# Patient Record
Sex: MALE | Race: WHITE | NOT HISPANIC OR LATINO | Employment: OTHER | URBAN - NONMETROPOLITAN AREA
[De-identification: names, ages, dates, MRNs, and addresses within clinical notes are randomized per-mention and may not be internally consistent; named-entity substitution may affect disease eponyms.]

---

## 2018-02-26 ENCOUNTER — OFFICE VISIT (OUTPATIENT)
Dept: FAMILY MEDICINE CLINIC | Facility: CLINIC | Age: 78
End: 2018-02-26

## 2018-02-26 VITALS
WEIGHT: 149.2 LBS | HEART RATE: 62 BPM | DIASTOLIC BLOOD PRESSURE: 40 MMHG | SYSTOLIC BLOOD PRESSURE: 91 MMHG | HEIGHT: 69 IN | OXYGEN SATURATION: 97 % | BODY MASS INDEX: 22.1 KG/M2

## 2018-02-26 DIAGNOSIS — R29.6 MULTIPLE FALLS: ICD-10-CM

## 2018-02-26 DIAGNOSIS — R53.1 WEAKNESS: Primary | ICD-10-CM

## 2018-02-26 DIAGNOSIS — N19 RENAL FAILURE, UNSPECIFIED CHRONICITY: ICD-10-CM

## 2018-02-26 DIAGNOSIS — R53.1 GENERALIZED WEAKNESS: ICD-10-CM

## 2018-02-26 DIAGNOSIS — R41.0 INTERMITTENT CONFUSION: ICD-10-CM

## 2018-02-26 LAB
ALBUMIN SERPL-MCNC: 4 G/DL (ref 3.4–4.8)
ALBUMIN/GLOB SERPL: 1.4 G/DL (ref 1.5–2.5)
ALP SERPL-CCNC: 91 U/L (ref 40–129)
ALT SERPL W P-5'-P-CCNC: 7 U/L (ref 10–44)
ANION GAP SERPL CALCULATED.3IONS-SCNC: 8.9 MMOL/L (ref 3.6–11.2)
AST SERPL-CCNC: 16 U/L (ref 10–34)
BASOPHILS # BLD AUTO: 0.05 10*3/MM3 (ref 0–0.3)
BASOPHILS NFR BLD AUTO: 0.5 % (ref 0–2)
BILIRUB SERPL-MCNC: 0.4 MG/DL (ref 0.2–1.8)
BUN BLD-MCNC: 37 MG/DL (ref 7–21)
BUN/CREAT SERPL: 17.6 (ref 7–25)
CALCIUM SPEC-SCNC: 8.9 MG/DL (ref 7.7–10)
CHLORIDE SERPL-SCNC: 106 MMOL/L (ref 99–112)
CO2 SERPL-SCNC: 24.1 MMOL/L (ref 24.3–31.9)
CREAT BLD-MCNC: 2.1 MG/DL (ref 0.43–1.29)
DEPRECATED RDW RBC AUTO: 44 FL (ref 37–54)
EOSINOPHIL # BLD AUTO: 0.22 10*3/MM3 (ref 0–0.7)
EOSINOPHIL NFR BLD AUTO: 2.1 % (ref 0–7)
ERYTHROCYTE [DISTWIDTH] IN BLOOD BY AUTOMATED COUNT: 13.4 % (ref 11.5–14.5)
GFR SERPL CREATININE-BSD FRML MDRD: 31 ML/MIN/1.73
GLOBULIN UR ELPH-MCNC: 2.8 GM/DL
GLUCOSE BLD-MCNC: 152 MG/DL (ref 70–110)
HCT VFR BLD AUTO: 36.2 % (ref 42–52)
HGB BLD-MCNC: 11.7 G/DL (ref 14–18)
IMM GRANULOCYTES # BLD: 0.01 10*3/MM3 (ref 0–0.03)
IMM GRANULOCYTES NFR BLD: 0.1 % (ref 0–0.5)
LYMPHOCYTES # BLD AUTO: 2.67 10*3/MM3 (ref 1–3)
LYMPHOCYTES NFR BLD AUTO: 25.9 % (ref 16–46)
MCH RBC QN AUTO: 29.5 PG (ref 27–33)
MCHC RBC AUTO-ENTMCNC: 32.3 G/DL (ref 33–37)
MCV RBC AUTO: 91.4 FL (ref 80–94)
MONOCYTES # BLD AUTO: 1.03 10*3/MM3 (ref 0.1–0.9)
MONOCYTES NFR BLD AUTO: 10 % (ref 0–12)
NEUTROPHILS # BLD AUTO: 6.34 10*3/MM3 (ref 1.4–6.5)
NEUTROPHILS NFR BLD AUTO: 61.4 % (ref 40–75)
OSMOLALITY SERPL CALC.SUM OF ELEC: 289.2 MOSM/KG (ref 273–305)
PLATELET # BLD AUTO: 207 10*3/MM3 (ref 130–400)
PMV BLD AUTO: 11.5 FL (ref 6–10)
POTASSIUM BLD-SCNC: 3.9 MMOL/L (ref 3.5–5.3)
PROT SERPL-MCNC: 6.8 G/DL (ref 6–8)
RBC # BLD AUTO: 3.96 10*6/MM3 (ref 4.7–6.1)
SODIUM BLD-SCNC: 139 MMOL/L (ref 135–153)
TSH SERPL DL<=0.05 MIU/L-ACNC: 6.41 MIU/ML (ref 0.55–4.78)
VIT B12 BLD-MCNC: 339 PG/ML (ref 211–911)
WBC NRBC COR # BLD: 10.32 10*3/MM3 (ref 4.5–12.5)

## 2018-02-26 PROCEDURE — 86038 ANTINUCLEAR ANTIBODIES: CPT | Performed by: NURSE PRACTITIONER

## 2018-02-26 PROCEDURE — 36415 COLL VENOUS BLD VENIPUNCTURE: CPT | Performed by: NURSE PRACTITIONER

## 2018-02-26 PROCEDURE — 82607 VITAMIN B-12: CPT | Performed by: NURSE PRACTITIONER

## 2018-02-26 PROCEDURE — 84443 ASSAY THYROID STIM HORMONE: CPT | Performed by: NURSE PRACTITIONER

## 2018-02-26 PROCEDURE — 80053 COMPREHEN METABOLIC PANEL: CPT | Performed by: NURSE PRACTITIONER

## 2018-02-26 PROCEDURE — 99204 OFFICE O/P NEW MOD 45 MIN: CPT | Performed by: NURSE PRACTITIONER

## 2018-02-26 PROCEDURE — 85025 COMPLETE CBC W/AUTO DIFF WBC: CPT | Performed by: NURSE PRACTITIONER

## 2018-02-26 RX ORDER — ATENOLOL 25 MG/1
12.5 TABLET ORAL DAILY
Qty: 30 TABLET | Refills: 5 | Status: SHIPPED | OUTPATIENT
Start: 2018-02-26

## 2018-02-26 RX ORDER — ATENOLOL 25 MG/1
12.5 TABLET ORAL DAILY
COMMUNITY
End: 2018-02-26 | Stop reason: SDUPTHER
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
## 2018-02-27 ENCOUNTER — TELEPHONE (OUTPATIENT)
Dept: FAMILY MEDICINE CLINIC | Facility: CLINIC | Age: 78
End: 2018-02-27

## 2018-02-27 NOTE — TELEPHONE ENCOUNTER
----- Message from MYLES Alejandra sent at 2/27/2018  1:31 PM EST -----  Can you forward these labs to [facility] Patient is admitted    Sent as requested.

## 2018-02-28 LAB
ANA SER QL: NEGATIVE
Lab: NORMAL

## 2018-05-03 ENCOUNTER — TRANSCRIBE ORDERS (OUTPATIENT)
Dept: ADMINISTRATIVE | Facility: HOSPITAL | Age: 78
End: 2018-05-03

## 2018-05-03 DIAGNOSIS — R13.10 DYSPHAGIA, UNSPECIFIED TYPE: Primary | ICD-10-CM

## 2018-05-08 ENCOUNTER — APPOINTMENT (OUTPATIENT)
Dept: GENERAL RADIOLOGY | Facility: HOSPITAL | Age: 78
End: 2018-05-08

## 2018-05-11 ENCOUNTER — HOSPITAL ENCOUNTER (OUTPATIENT)
Dept: GENERAL RADIOLOGY | Facility: HOSPITAL | Age: 78
Discharge: HOME | End: 2018-05-11
Admitting: FAMILY MEDICINE

## 2018-05-11 DIAGNOSIS — R13.10 DYSPHAGIA, UNSPECIFIED TYPE: ICD-10-CM

## 2018-05-11 PROCEDURE — 74230 X-RAY XM SWLNG FUNCJ C+: CPT

## 2018-05-11 PROCEDURE — G8996 SWALLOW CURRENT STATUS: HCPCS

## 2018-05-11 PROCEDURE — G8998 SWALLOW D/C STATUS: HCPCS

## 2018-05-11 PROCEDURE — 74230 X-RAY XM SWLNG FUNCJ C+: CPT | Performed by: RADIOLOGY

## 2018-05-11 PROCEDURE — G8997 SWALLOW GOAL STATUS: HCPCS

## 2018-05-11 NOTE — MBS/VFSS/FEES
"Outpatient Speech Language Pathology   Adult Swallow Initial Evaluation   [facility]   OUTPATIENT MODIFIED BARIUM SWALLOW STUDY     Patient Name: Pablo Grsosman  : 1940  MRN: [id number]  Today's Date: 2018      Pablo Grossman  presents to the radiology suite this am from [facility] to participate in an instrumental MBS to assess safety/efficacy of swallowing fnx, determine safest/least restrictive diet.      Chest xray is not available for review.     He is observed on O2 via NC.     Risks and benefits of the procedure are explained w/ verbalizing understanding/agreement to participate. Proceed per protocol.     Mr. Grossman is positioned upright and centered on a stretcher to accept multiple po presentations of solid cracker, puree, honey thick, nectar thick, and thin liquids via spoon, cup and straw, along w/ whole placebo pill in puree. He is able to self feed.     All views are from the lateral plane.     Facial/oral structures are symmetrical upon observation w/o lingual deviation upon protrusion. Oral mucosa are moist, pink and clean. Secretions are clear, thin and controlled. OROM/SCARLET is moderately weak to imitate oral postures. Gag is not assessed. Volitional cough is adequate in intensity, congestive in quality, nonproductive. Vocal quality is adequate in intensity, clear in quality w/ intelligible speech. He is a/a and cooperative to participate, oriented to person, place, and time, follows simple directives, and participates in simple conversational exchanges. He reports, \" I haven't eaten in over a month!\" He is eager for po intake.     Upon po presentations, adequate bolus anticipation w/ good labial seal for bolus clearance via spoon bowl, cup rim stability and suction via straw.  Bolus formation, manipulation,  and control are moderately weak w/ increased oral prep time w/ solids, mixed w/ rotary/phasic mastication pattern. A-p transit is slightly delayed w/ " piecemeal deglutition of solids and puree, w/ tongue base residue. Residue is diminished w/ a spontaneous consecutive swallow.  Tongue base retraction and linguavelar seal are adequate w/o significant premature spillage. No laryngeal penetration or aspiration is evidenced before the swallow.     Pharyngeal swallow is timely w/ weak and delayed hyolaryngeal elevation and delayed epiglottic inversion. Pharyngeal contraction is weak w/ diffuse residue of the vallecular space and bilateral pyriforms w/ all consistencies. This residue can be diminished w/ cued consecutive swallow, but to cleared. Deep laryngeal penetration of nectar thick liquids occurs during the swallow via straw and uncontrolled cup bolus presentations. Penetration clears w/o residue. Penetration is alleviated w/ bolus size control to approximately 10cc. Aspiration of thin liquids occurs during the swallow w/ cough response elicited. This occurs w/ all thin presentation styles. No other laryngeal penetration or aspiration evidenced during or after the swallow.     Full esophageal sweep reveals no mucosal abnormalities. Motility is wfl w/o retrograde flow noted.      Impression: Mr. Grossman presents w/a mild-moderate oropharyngeal dysphagia w/ aspiration of thin liquids during the swallow w/ cough response elicited. Deep laryngeal penetration of nectar thick liquids occurs during the swallow via straw and uncontrolled cup bolus presentations. Penetration clears w/o residue. Penetration is alleviated w/ bolus size control to approximately 10cc.He will benefit from modified po diet of puree, nectar thick liquids w/ Provale cup (10cc) to control liquid bolus size. He will benefit from formal dysphagia tx.     Recommendations:   1. Pure diet, nectar thick liquids only. ALL liquids via PROVALE CUP (10CC).  2. Meds whole in puree/nectar.   3. Universal aspiration precautions.   4. HILDA precautions.    5. Oral care protocol.   6. Formal dysphagia tx per  facility SLP per poc.     D/w pt results and recommendations w/ verbal understanding and agreement. Educated on aspiration, provale cup, nectar thick liquids w/ verbal understanding.     Thank you for allowing me to participate in the care of your patient-  Mis Yusuf M.S., CCC/SLP       Visit Date: 05/11/2018   There is no problem list on file for this patient.       Past Medical History:   Diagnosis Date   • Arthritis    • Back pain    • Diabetes mellitus    • Hypertension    • Shoulder pain    • Sinusitis         No past surgical history on file.      Visit Dx:     ICD-10-CM ICD-9-CM   1. Dysphagia, unspecified type R13.10 787.20             OP SLP Education     Row Name 05/11/18 1258       Education    Barriers to Learning No barriers identified  -JUAN ANTONIO    Education Provided Described results of evaluation;Patient expressed understanding of evaluation  -JUAN ANTONIO    Assessed Learning needs  -JUAN ANTONIO    Learning Motivation Strong  -JUAN ANTONIO    Learning Method Explanation;Teach back  -JUAN ANTONIO    Teaching Response Verbalized understanding  -JUAN ANTONIO      User Key  (r) = Recorded By, (t) = Taken By, (c) = Cosigned By    Initials Name Effective Dates     Mis Yusuf MS CCC-SLP 04/03/18 -           Time Calculation:        Therapy Charges for Today     Code Description Service Date Service Provider Modifiers Qty    00729488118 HC ST SWALLOWING CURRENT STATUS 5/11/2018 Mis Yusuf MS CCC-SLP ANDREINA  1    74044812289 HC ST SWALLOWING PROJECTED 5/11/2018 Mis Yusuf MS CCC-SLP ANDREINA  1    32077640046 HC ST SWALLOWING DISCHARGE 5/11/2018 Mis Yusuf MS CCC-SLP ANDREINA  1          SLP G-Codes  SLP NOMS Used?: Yes  Functional Limitations: Swallowing  Swallow Current Status (): At least 20 percent but less than 40 percent impaired, limited or restricted  Swallow Goal Status (): At least 20 percent but less than 40 percent impaired, limited or restricted  Swallow Discharge Status (): At least 20 percent but  less than 40 percent impaired, limited or restricted        Mis Yusuf, MS CCC-SLP  5/11/2018